# Patient Record
Sex: MALE | Race: BLACK OR AFRICAN AMERICAN | NOT HISPANIC OR LATINO
[De-identification: names, ages, dates, MRNs, and addresses within clinical notes are randomized per-mention and may not be internally consistent; named-entity substitution may affect disease eponyms.]

---

## 2019-09-19 ENCOUNTER — HOSPITAL (OUTPATIENT)
Dept: OTHER | Age: 39
End: 2019-09-19

## 2019-09-19 LAB — GLUCOSE BLDC GLUCOMTR-MCNC: 105 MG/DL (ref 70–99)

## 2019-09-19 PROCEDURE — 99283 EMERGENCY DEPT VISIT LOW MDM: CPT | Performed by: EMERGENCY MEDICINE

## 2023-03-27 ENCOUNTER — TELEPHONE (OUTPATIENT)
Dept: ORTHOPEDICS CLINIC | Facility: CLINIC | Age: 43
End: 2023-03-27

## 2023-03-27 NOTE — TELEPHONE ENCOUNTER
Please advise if/when you would be able to see this patient.  No imaging or previous visits within [address]

## 2023-03-27 NOTE — TELEPHONE ENCOUNTER
Future Appointments   Date Time Provider Nicholas Pretty   4/17/2023 11:30 AM José Luis Harper MD [id number]       This patient is coming for LT Middle Finger Injury. No prior imaging done yet. Please advise if views are needed for this appt. Thanks.       Patient can be reached at [phone]

## 2023-03-27 NOTE — TELEPHONE ENCOUNTER
Patient called requesting an appt due to a left middle finger nail injury. Patient does not have a nail on the finger and would like to be seen, please advise if  can see for this issue or should be seen by someone else.

## 2023-03-27 NOTE — TELEPHONE ENCOUNTER
Patient can be added to 's or Maranda Holbrook next available  spot per .  Please schedule the patient thank you